# Patient Record
Sex: FEMALE | Race: WHITE | HISPANIC OR LATINO | Employment: FULL TIME | URBAN - METROPOLITAN AREA
[De-identification: names, ages, dates, MRNs, and addresses within clinical notes are randomized per-mention and may not be internally consistent; named-entity substitution may affect disease eponyms.]

---
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
## 2022-02-03 ENCOUNTER — OFFICE VISIT (OUTPATIENT)
Dept: NEUROLOGY | Facility: CLINIC | Age: 22
End: 2022-02-03
Payer: MEDICAID

## 2022-02-03 VITALS
BODY MASS INDEX: 24 KG/M2 | RESPIRATION RATE: 16 BRPM | SYSTOLIC BLOOD PRESSURE: 120 MMHG | HEART RATE: 94 BPM | DIASTOLIC BLOOD PRESSURE: 70 MMHG | WEIGHT: 121 LBS

## 2022-02-03 DIAGNOSIS — F41.9 ANXIETY: ICD-10-CM

## 2022-02-03 DIAGNOSIS — R25.1 EPISODE OF SHAKING: Primary | ICD-10-CM

## 2022-02-03 DIAGNOSIS — R55 SYNCOPE, UNSPECIFIED SYNCOPE TYPE: ICD-10-CM

## 2022-02-03 PROCEDURE — 99204 OFFICE O/P NEW MOD 45 MIN: CPT | Performed by: OTHER

## 2022-02-03 PROCEDURE — 3074F SYST BP LT 130 MM HG: CPT | Performed by: OTHER

## 2022-02-03 PROCEDURE — 3078F DIAST BP <80 MM HG: CPT | Performed by: OTHER

## 2022-02-03 RX ORDER — HYDROXYZINE HYDROCHLORIDE 25 MG/1
25 TABLET, FILM COATED ORAL EVERY 8 HOURS PRN
COMMUNITY
Start: 2022-01-18

## 2022-02-03 NOTE — PROGRESS NOTES
Patient here for evaluation of tremors in body for approx 1 year.  Had 2 episodes of loss of consciousness, 8/2021 & 12/2021

## 2022-02-11 ENCOUNTER — HOSPITAL ENCOUNTER (OUTPATIENT)
Dept: MRI IMAGING | Facility: HOSPITAL | Age: 22
Discharge: HOME | End: 2022-02-11
Attending: Other
Payer: MEDICAID

## 2022-02-11 DIAGNOSIS — R25.1 EPISODE OF SHAKING: ICD-10-CM

## 2022-02-11 PROCEDURE — 70551 MRI BRAIN STEM W/O DYE: CPT | Performed by: OTHER

## 2022-03-07 ENCOUNTER — NURSE ONLY (OUTPATIENT)
Dept: ELECTROPHYSIOLOGY | Facility: HOSPITAL | Age: 22
End: 2022-03-07
Attending: Other
Payer: MEDICAID

## 2022-03-07 DIAGNOSIS — R25.1 EPISODE OF SHAKING: ICD-10-CM

## 2022-03-07 PROCEDURE — 95816 EEG AWAKE AND DROWSY: CPT | Performed by: OTHER

## 2022-03-07 NOTE — PROCEDURES
Date of Procedure: 3/7/2022    Procedure: EEG (ELECTROENCEPHALOGRAM)     EPISODE OF SHAKING  HX:A 22YO FEMALE WHO PRESENTS FOR SHAKING/TEMOORS IN BODY FOR APPROX 1YR INTERMITTENTLY. PT HAD 2 EPISODES ASSOCIATED WITH LOC MOST OTHER OCCASIONS LIKELY DUE TO ANXIETY OR NERVIOUSNESS PER PT. DENIES HX OF SZ OR EPILESPY, NO FAMILY HX EITHER. ECG WAS REPORTED NORMAL BUT REPORTS HEART PALPITATIONS OFTEN. PT HAD ONE STARTLE LIKE JERKING MVMT WHILE SITTING IN [employer]ORS OFFICE PT WAS VERY NERVOUS ON 2/3/22. PT WAS GIVEN HYDROXYZINE FOR ANXIETY BUT PT ONLY TOOK FOR ONE WEEK AND THEN STOPPED REPORTED SHE DIDN'T NOTICE ANY DIFFERENCE. BACKGROUND ACTIVITY: Posterior rhythm was in the range of 7-9 Hz, reactive to eye opening; symmetrical and synchronous. Noted also are generalized intermittent slowing. Drowsiness is characterized by intermittent theta waves bitemporally. Occasional sharp transients noted in posterior region. EPILEPTIFORM DISCHARGES: There were intermittent runs of rhythmic slow sharp waves, occasional polyspikes in bilateral fronto-central regions with brief secondary generalization recorded. HYPERVENTILATION: Hyperventilation was performed with no change. PHOTIC STIMULATION: Photic stimulation was performed with no change. Stage II sleep was not reached. IMPRESSION: Abnormal EEG with the presence of sharp waves in bilateral fronto-central regions with brief secondary generalization suggestive of a low threshold for seizures. Clinical correlation is advised.     Caridad Lawson MD   Neurology  [facility]  3/7/2022, 12:50 PM  CC: None Pcp

## 2022-03-15 ENCOUNTER — OFFICE VISIT (OUTPATIENT)
Dept: NEUROLOGY | Facility: CLINIC | Age: 22
End: 2022-03-15
Payer: MEDICAID

## 2022-03-15 VITALS
SYSTOLIC BLOOD PRESSURE: 104 MMHG | HEIGHT: 60 IN | WEIGHT: 122 LBS | DIASTOLIC BLOOD PRESSURE: 68 MMHG | RESPIRATION RATE: 16 BRPM | HEART RATE: 92 BPM | BODY MASS INDEX: 23.95 KG/M2

## 2022-03-15 DIAGNOSIS — G40.909 SEIZURE DISORDER (HCC): Primary | ICD-10-CM

## 2022-03-15 PROCEDURE — 99215 OFFICE O/P EST HI 40 MIN: CPT | Performed by: OTHER

## 2022-03-15 PROCEDURE — 3008F BODY MASS INDEX DOCD: CPT | Performed by: OTHER

## 2022-03-15 PROCEDURE — 3074F SYST BP LT 130 MM HG: CPT | Performed by: OTHER

## 2022-03-15 PROCEDURE — 3078F DIAST BP <80 MM HG: CPT | Performed by: OTHER

## 2022-03-15 RX ORDER — TOPIRAMATE 25 MG/1
TABLET ORAL
Qty: 120 TABLET | Refills: 2 | Status: SHIPPED | OUTPATIENT
Start: 2022-03-15

## 2022-03-15 NOTE — PROGRESS NOTES
LOV 2/3/22 Episode of Shaking/Syncope/Anxiety f/u- Patient states she is still having shaking episodes. Patient denies any syncope episodes. Patient never went to see Cardiologist. Patient had EEG done 3/7/22. Patient had MRI BRAIN done 2/11/22.

## 2022-06-07 NOTE — ED INITIAL ASSESSMENT (HPI)
PT BIT BY DOG ON LEFT HAND BY DOG ON 11/30, C/O NUMBNESS AND COLD TO TOUCH   PT WAS SEEN IN URGENT CARE AND GIVEN ABX.    PT WAKES UP FROM SLEEP WITH STINGING AND ITCHINESS TO SITE, STERI STRIPS IN PLACE, PT UNABLE TO OPEN HAND COMPLETELY PAST MEDICAL HISTORY:  Marysol-Danlos disease     Endometriosis     Fibromyalgia     GERD (gastroesophageal reflux disease)

## 2022-11-07 RX ORDER — TOPIRAMATE 25 MG/1
TABLET ORAL
Qty: 120 TABLET | Refills: 5 | OUTPATIENT
Start: 2022-11-07

## 2022-11-08 RX ORDER — TOPIRAMATE 25 MG/1
50 TABLET ORAL 2 TIMES DAILY
Qty: 120 TABLET | Refills: 5 | Status: SHIPPED | OUTPATIENT
Start: 2022-11-08

## 2022-11-15 ENCOUNTER — OFFICE VISIT (OUTPATIENT)
Dept: NEUROLOGY | Facility: CLINIC | Age: 22
End: 2022-11-15
Payer: MEDICAID

## 2022-11-15 VITALS
RESPIRATION RATE: 16 BRPM | WEIGHT: 101 LBS | HEART RATE: 70 BPM | DIASTOLIC BLOOD PRESSURE: 70 MMHG | SYSTOLIC BLOOD PRESSURE: 122 MMHG | BODY MASS INDEX: 20 KG/M2

## 2022-11-15 DIAGNOSIS — G40.909 SEIZURE DISORDER (HCC): Primary | ICD-10-CM

## 2022-11-15 DIAGNOSIS — F41.9 ANXIETY: ICD-10-CM

## 2022-11-15 PROCEDURE — 3074F SYST BP LT 130 MM HG: CPT | Performed by: OTHER

## 2022-11-15 PROCEDURE — 3078F DIAST BP <80 MM HG: CPT | Performed by: OTHER

## 2022-11-15 PROCEDURE — 99214 OFFICE O/P EST MOD 30 MIN: CPT | Performed by: OTHER

## 2022-11-15 RX ORDER — LEVETIRACETAM 500 MG/1
500 TABLET ORAL 2 TIMES DAILY
Qty: 60 TABLET | Refills: 5 | Status: SHIPPED | OUTPATIENT
Start: 2022-11-15

## 2022-11-15 NOTE — PROGRESS NOTES
Patient here to follow up regarding seizures. No seizures since last visit.  Has not yet seen psychiatrist.

## 2023-04-20 DIAGNOSIS — G40.909 SEIZURE DISORDER (HCC): Primary | ICD-10-CM

## 2023-04-21 RX ORDER — LEVETIRACETAM 500 MG/1
500 TABLET ORAL 2 TIMES DAILY
Qty: 60 TABLET | Refills: 2 | Status: SHIPPED | OUTPATIENT
Start: 2023-04-21

## 2023-07-18 DIAGNOSIS — G40.909 SEIZURE DISORDER (HCC): ICD-10-CM

## 2023-07-18 RX ORDER — LEVETIRACETAM 500 MG/1
500 TABLET ORAL 2 TIMES DAILY
Qty: 60 TABLET | Refills: 2 | Status: SHIPPED | OUTPATIENT
Start: 2023-07-18

## 2023-07-18 NOTE — TELEPHONE ENCOUNTER
Medication: LEVETIRACETAM 500 MG Oral Tab     Date of last refill: 04/21/23 (60/2)  [place] 30/98/95    Last office visit: 11/15/22  Due back to clinic per last office note:  6 months  Date next office visit scheduled:  No future appointments. Last OV note recommendation:        Plan:  Instructed pt to wean off topamax due to weight loss: take 1 tab bid po x 1 week, then stop completely  Start keppra 500 mg bid in the meantime, some drowsiness and fatigue are common side effects when starting this medication, please monitor and keep us informed with any other possible side effects  Advised pt to see PCP (re; weight loss) and Psychiatry (re; anxiety) when able  D/w pt re; assessment , care plan  See orders and medications filed with this encounter. The patient indicates understanding of these issues and agrees with the plan.   Patient should not drive, operate heavy machine unless seizure free for 6 months  RTC 6 months, other AED options; lamictal, zonegran

## 2023-07-21 DIAGNOSIS — G40.909 SEIZURE DISORDER (HCC): ICD-10-CM

## 2023-07-21 NOTE — TELEPHONE ENCOUNTER
Duplicated medication refill request 07/18/23    Medication: levETIRAcetam 500 MG Oral Tab     Date of last refill: 07/18/23 (60/2)  [place] 32[place]38[place]02

## 2023-07-24 RX ORDER — LEVETIRACETAM 500 MG/1
500 TABLET ORAL 2 TIMES DAILY
Qty: 60 TABLET | Refills: 2 | OUTPATIENT
Start: 2023-07-24

## 2023-09-28 ENCOUNTER — LAB ENCOUNTER (OUTPATIENT)
Dept: LAB | Age: 23
End: 2023-09-28
Attending: OBSTETRICS & GYNECOLOGY
Payer: MEDICAID

## 2023-09-28 ENCOUNTER — OFFICE VISIT (OUTPATIENT)
Dept: OBGYN CLINIC | Facility: CLINIC | Age: 23
End: 2023-09-28

## 2023-09-28 VITALS
WEIGHT: 123.63 LBS | HEIGHT: 60 IN | SYSTOLIC BLOOD PRESSURE: 112 MMHG | DIASTOLIC BLOOD PRESSURE: 72 MMHG | BODY MASS INDEX: 24.27 KG/M2

## 2023-09-28 DIAGNOSIS — Z13.220 SCREENING, LIPID: ICD-10-CM

## 2023-09-28 DIAGNOSIS — Z13.29 THYROID DISORDER SCREENING: ICD-10-CM

## 2023-09-28 DIAGNOSIS — Z01.419 ENCOUNTER FOR WELL WOMAN EXAM WITH ROUTINE GYNECOLOGICAL EXAM: Primary | ICD-10-CM

## 2023-09-28 DIAGNOSIS — Z13.1 SCREENING FOR DIABETES MELLITUS (DM): ICD-10-CM

## 2023-09-28 DIAGNOSIS — Z11.3 SCREENING FOR STD (SEXUALLY TRANSMITTED DISEASE): ICD-10-CM

## 2023-09-28 DIAGNOSIS — Z12.4 ENCOUNTER FOR PAPANICOLAOU SMEAR FOR CERVICAL CANCER SCREENING: ICD-10-CM

## 2023-09-28 LAB
ALBUMIN SERPL-MCNC: 4 G/DL (ref 3.4–5)
ALBUMIN/GLOB SERPL: 1 {RATIO} (ref 1–2)
ALP LIVER SERPL-CCNC: 76 U/L
ALT SERPL-CCNC: 43 U/L
ANION GAP SERPL CALC-SCNC: 7 MMOL/L (ref 0–18)
AST SERPL-CCNC: 23 U/L (ref 15–37)
BILIRUB SERPL-MCNC: 0.4 MG/DL (ref 0.1–2)
BUN BLD-MCNC: 9 MG/DL (ref 7–18)
CALCIUM BLD-MCNC: 8.8 MG/DL (ref 8.5–10.1)
CHLORIDE SERPL-SCNC: 107 MMOL/L (ref 98–112)
CHOLEST SERPL-MCNC: 152 MG/DL (ref ?–200)
CO2 SERPL-SCNC: 24 MMOL/L (ref 21–32)
CREAT BLD-MCNC: 0.7 MG/DL
EGFRCR SERPLBLD CKD-EPI 2021: 126 ML/MIN/1.73M2 (ref 60–?)
FASTING PATIENT LIPID ANSWER: NO
FASTING STATUS PATIENT QL REPORTED: NO
GLOBULIN PLAS-MCNC: 3.9 G/DL (ref 2.8–4.4)
GLUCOSE BLD-MCNC: 92 MG/DL (ref 70–99)
HDLC SERPL-MCNC: 71 MG/DL (ref 40–59)
LDLC SERPL CALC-MCNC: 69 MG/DL (ref ?–100)
NONHDLC SERPL-MCNC: 81 MG/DL (ref ?–130)
OSMOLALITY SERPL CALC.SUM OF ELEC: 284 MOSM/KG (ref 275–295)
POTASSIUM SERPL-SCNC: 4.1 MMOL/L (ref 3.5–5.1)
PROT SERPL-MCNC: 7.9 G/DL (ref 6.4–8.2)
SODIUM SERPL-SCNC: 138 MMOL/L (ref 136–145)
TRIGL SERPL-MCNC: 58 MG/DL (ref 30–149)
TSI SER-ACNC: 0.85 MIU/ML (ref 0.36–3.74)
VLDLC SERPL CALC-MCNC: 9 MG/DL (ref 0–30)

## 2023-09-28 PROCEDURE — 80053 COMPREHEN METABOLIC PANEL: CPT

## 2023-09-28 PROCEDURE — 3078F DIAST BP <80 MM HG: CPT | Performed by: OBSTETRICS & GYNECOLOGY

## 2023-09-28 PROCEDURE — 3008F BODY MASS INDEX DOCD: CPT | Performed by: OBSTETRICS & GYNECOLOGY

## 2023-09-28 PROCEDURE — 99385 PREV VISIT NEW AGE 18-39: CPT | Performed by: OBSTETRICS & GYNECOLOGY

## 2023-09-28 PROCEDURE — 84443 ASSAY THYROID STIM HORMONE: CPT

## 2023-09-28 PROCEDURE — 80061 LIPID PANEL: CPT

## 2023-09-28 PROCEDURE — 3074F SYST BP LT 130 MM HG: CPT | Performed by: OBSTETRICS & GYNECOLOGY

## 2023-09-29 LAB
C TRACH DNA SPEC QL NAA+PROBE: NEGATIVE
N GONORRHOEA DNA SPEC QL NAA+PROBE: NEGATIVE

## 2023-10-03 LAB — HPV I/H RISK 1 DNA SPEC QL NAA+PROBE: NEGATIVE

## 2023-10-28 DIAGNOSIS — G40.909 SEIZURE DISORDER (HCC): ICD-10-CM

## 2023-10-30 RX ORDER — LEVETIRACETAM 500 MG/1
500 TABLET ORAL 2 TIMES DAILY
Qty: 60 TABLET | Refills: 0 | Status: SHIPPED | OUTPATIENT
Start: 2023-10-30

## 2023-10-30 NOTE — TELEPHONE ENCOUNTER
Medication:  LEVETIRACETAM 500 MG Oral Tab     Date of last refill: 07/18/23 (60/2)  [place] 04/62/92    Last office visit: 11/15/22  Due back to clinic per last office note:  6 months  Date next office visit scheduled:    Future Appointments   Date Time Provider Minnie Sesay   10/3/2024 10:00 AM Tita Dangelo MD [facility]        Last OV note recommendation:       Plan:  Instructed pt to wean off topamax due to weight loss: take 1 tab bid po x 1 week, then stop completely  Start keppra 500 mg bid in the meantime, some drowsiness and fatigue are common side effects when starting this medication, please monitor and keep us informed with any other possible side effects  Advised pt to see PCP (re; weight loss) and Psychiatry (re; anxiety) when able  D/w pt re; assessment , care plan  See orders and medications filed with this encounter. The patient indicates understanding of these issues and agrees with the plan.   Patient should not drive, operate heavy machine unless seizure free for 6 months  RTC 6 months, other AED options; lamictal, zonegran     Orders Placed This Encounter      Psychiatry Referral - In Network given again today     Pt should go ER for any new or worsening symptoms and contact office

## 2023-11-21 DIAGNOSIS — G40.909 SEIZURE DISORDER (HCC): ICD-10-CM

## 2023-11-21 RX ORDER — LEVETIRACETAM 500 MG/1
500 TABLET ORAL 2 TIMES DAILY
Qty: 60 TABLET | Refills: 0 | Status: SHIPPED | OUTPATIENT
Start: 2023-11-21

## 2023-11-21 NOTE — TELEPHONE ENCOUNTER
Sent the patient a [employer] message to make an appt for further medication refills. Medication:  levETIRAcetam 500 MG Oral Tab      Date of last refill: 10/30/2023 (#60/0)  [place]     Last office visit: 11/15/2022  Due back to clinic per last office note:  6 months  Date next office visit scheduled:    Future Appointments   Date Time Provider Minnie Sesay   10/3/2024 10:00 AM MD BERE OrourkeWDSHAUN ADAMSON           Last OV note recommendation:    Plan:  Instructed pt to wean off topamax due to weight loss: take 1 tab bid po x 1 week, then stop completely  Start keppra 500 mg bid in the meantime, some drowsiness and fatigue are common side effects when starting this medication, please monitor and keep us informed with any other possible side effects  Advised pt to see PCP (re; weight loss) and Psychiatry (re; anxiety) when able  D/w pt re; assessment , care plan  See orders and medications filed with this encounter. The patient indicates understanding of these issues and agrees with the plan.   Patient should not drive, operate heavy machine unless seizure free for 6 months  RTC 6 months, other AED options; lamictal, zonegran     Orders Placed This Encounter      Psychiatry Referral - In Network given again today     Pt should go ER for any new or worsening symptoms and contact office

## 2024-01-29 DIAGNOSIS — G40.909 SEIZURE DISORDER (HCC): ICD-10-CM

## 2024-01-29 NOTE — TELEPHONE ENCOUNTER
Pt has appt scheduled for tomorrow, 1/30/24. Per last refill, has been out for some time. Can be filled at OV tomorrow. Will hold Rx     Medication: Keppra    Date of last refill: 11/21/23 for #60/0 additional refill  [place]    Last office visit: 11/15/22  Due back to clinic per last office note:  6 months  Date next office visit scheduled:  1/30/24    Last OV note recommendation: per Dr. Patel:    Plan:  Instructed pt to wean off topamax due to weight loss: take 1 tab bid po x 1 week, then stop completely  Start keppra 500 mg bid in the meantime, some drowsiness and fatigue are common side effects when starting this medication, please monitor and keep us informed with any other possible side effects  Advised pt to see PCP (re; weight loss) and Psychiatry (re; anxiety) when able  D/w pt re; assessment , care plan  See orders and medications filed with this encounter. The patient indicates understanding of these issues and agrees with the plan.  Patient should not drive, operate heavy machine unless seizure free for 6 months  RTC 6 months, other AED options; lamictal, zonegran     Orders Placed This Encounter      Psychiatry Referral - In Network given again today     Pt should go ER for any new or worsening symptoms and contact office

## 2024-01-30 ENCOUNTER — OFFICE VISIT (OUTPATIENT)
Dept: NEUROLOGY | Facility: CLINIC | Age: 24
End: 2024-01-30

## 2024-01-30 VITALS — HEART RATE: 78 BPM | DIASTOLIC BLOOD PRESSURE: 64 MMHG | RESPIRATION RATE: 16 BRPM | SYSTOLIC BLOOD PRESSURE: 122 MMHG

## 2024-01-30 DIAGNOSIS — G43.009 MIGRAINE WITHOUT AURA AND WITHOUT STATUS MIGRAINOSUS, NOT INTRACTABLE: ICD-10-CM

## 2024-01-30 DIAGNOSIS — G40.909 SEIZURE DISORDER (HCC): Primary | ICD-10-CM

## 2024-01-30 PROCEDURE — 99215 OFFICE O/P EST HI 40 MIN: CPT | Performed by: OTHER

## 2024-01-30 RX ORDER — LEVETIRACETAM 500 MG/1
500 TABLET ORAL 2 TIMES DAILY
Qty: 180 TABLET | Refills: 3 | Status: SHIPPED | OUTPATIENT
Start: 2024-01-30

## 2024-01-30 RX ORDER — RIMEGEPANT SULFATE 75 MG/75MG
75 TABLET, ORALLY DISINTEGRATING ORAL AS NEEDED
Qty: 4 TABLET | Refills: 0 | COMMUNITY
Start: 2024-01-30 | End: 2025-01-29

## 2024-01-30 NOTE — PATIENT INSTRUCTIONS
Refill policies:    Allow 2-3 business days for refills; controlled substances may take longer.  Contact your pharmacy at least 5 days prior to running out of medication and have them send an electronic request or submit request through the “request refill” option in your [employer] account.  Refills are not addressed on weekends; covering physicians do not authorize routine medications on weekends.  No narcotics or controlled substances are refilled after noon on Fridays or by on call physicians.  By law, narcotics must be electronically prescribed.  A 30 day supply with no refills is the maximum allowed.  If your prescription is due for a refill, you may be due for a follow up appointment.  To best provide you care, patients receiving routine medications need to be seen at least once a year.  Patients receiving narcotic/controlled substance medications need to be seen at least once every 3 months.  In the event that your preferred pharmacy does not have the requested medication in stock (e.g. Backordered), it is your responsibility to find another pharmacy that has the requested medication available.  We will gladly send a new prescription to that pharmacy at your request.    Scheduling Tests:    If your physician has ordered radiology tests such as MRI or CT scans, please contact Central Scheduling at [phone] right away to schedule the test.  Once scheduled, the [facility] hospital Centralized Referral Team will work with your insurance carrier to obtain pre-certification or prior authorization.  Depending on your insurance carrier, approval may take 3-10 days.  It is highly recommended patients assure they have received an authorization before having a test performed.  If test is done without insurance authorization, patient may be responsible for the entire amount billed.      Precertification and Prior Authorizations:  If your physician has recommended that you have a procedure or additional testing performed the [facility] hospital  Centralized Referral Team will contact your insurance carrier to obtain pre-certification or prior authorization.    You are strongly encouraged to contact your insurance carrier to verify that your procedure/test has been approved and is a COVERED benefit.  Although the [facility] Centralized Referral Team does its due diligence, the insurance carrier gives the disclaimer that \"Although the procedure is authorized, this does not guarantee payment.\"    Ultimately the patient is responsible for payment.   Thank you for your understanding in this matter.  Paperwork Completion:  If you require FMLA or disability paperwork for your recovery, please make sure to either drop it off or have it faxed to our office at [phone]. Be sure the form has your name and date of birth on it.  The form will be faxed to our Forms Department and they will complete it for you.  There is a 25$ fee for all forms that need to be filled out.  Please be aware there is a 10-14 day turnaround time.  You will need to sign a release of information (TRISTON) form if your paperwork does not come with one.  You may call the Forms Department with any questions at [phone].  Their fax number is [phone].

## 2024-01-30 NOTE — PROGRESS NOTES
Patient here to follow up regarding seizures. Did have seizure in December 2023. Has been taking Keppra daily   Other Specify

## 2024-01-30 NOTE — PROGRESS NOTES
[facility] Neurology Outpatient Progress Note  Date of service: 1/30/2024    Assessment:     ICD-10-CM    1. Seizure disorder (HCC) : worse G40.909       Headache; migraine vs ictal headache    Plan:  Seizure precaution reviewed  EEG  Check keppra level  Compliance education given  Keppra 500 mg bid  Nurtec prn trial for headache , sample given  See orders and medications filed with this encounter. The patient indicates understanding of these issues and agrees with the plan.  Discussed with patient/family regarding assessment, work up, care plan   patient should not drive, operate heavy machine, climb ladder, swim, or tub bath for seizure precaution unless patient has been seizure free for > 6 months.  RTC 6 months  Pt should go ER for any new or worsening symptoms and contact office for above tests' results, any possible side effects from medication or other concerns.    Subjective:   History:  Patient here for a follow-up visit for seizure. Since last visit she had breakthrough seizure in December. States she has headache often.  She is only taking keppra once a day, self reduced keppa.  Last visit was 11/15/2022. Has not seen me since.  Pt had seizures at work and did not go to er .   Had a seizure when at work on December 29th. Hit head hard on floor, at [employer]. It was unwitnessed. Last seizure prior to recent one was December of 2022.     History/Other:   REVIEW OF SYSTEMS:  A 10-point system was reviewed. Pertinent positives and negatives are noted as above       Current Outpatient Medications:     levETIRAcetam 500 MG Oral Tab, Take 1 tablet (500 mg total) by mouth 2 (two) times daily. (Patient taking differently: Take 1 tablet (500 mg total) by mouth daily.), Disp: 60 tablet, Rfl: 0  Allergies:  Allergies   Allergen Reactions    Seasonal Runny nose     Past Medical History:   Diagnosis Date    Anxiety     Laceration of hand 05/16/2022    cut hand, stitches    Seizures (HCC)      No past surgical history on  file.  Social History:  Social History     Tobacco Use    Smoking status: Never    Smokeless tobacco: Never   Substance Use Topics    Alcohol use: Never     Family History   Problem Relation Age of Onset    No Known Problems Father     No Known Problems Mother       Objective:   Neurological Examination:  /64   Pulse 78   Resp 16   LMP 09/09/2023   Mental status: A & O X 3  Language: no aphasia  Speech: no dysarthria  CN II-XII: intact   Motor strength: 5/5 all extremities  Tone: normal  DTRs: 2+ symmetric  Coordination: normal  Sensory: symmetric  Gait: normal    Test reviewed on 1/30/2024      Bryce Patel MD (Michael)  Neurology  [facility]  1/30/2024, 11:33 AM  CC: Nancy Perry

## 2024-02-01 RX ORDER — LEVETIRACETAM 500 MG/1
500 TABLET ORAL 2 TIMES DAILY
Qty: 180 TABLET | Refills: 0 | OUTPATIENT
Start: 2024-02-01

## 2024-05-21 DIAGNOSIS — G40.909 SEIZURE DISORDER (HCC): ICD-10-CM

## 2024-05-22 RX ORDER — LEVETIRACETAM 500 MG/1
500 TABLET ORAL 2 TIMES DAILY
Qty: 180 TABLET | Refills: 3 | OUTPATIENT
Start: 2024-05-22

## 2024-05-22 NOTE — TELEPHONE ENCOUNTER
Spoke with [facility] pharmacist ( Taya) who states patient has refills and will get it ready for her.

## 2024-07-30 ENCOUNTER — TELEPHONE (OUTPATIENT)
Dept: NEUROLOGY | Facility: CLINIC | Age: 24
End: 2024-07-30

## 2024-07-30 ENCOUNTER — OFFICE VISIT (OUTPATIENT)
Dept: NEUROLOGY | Facility: CLINIC | Age: 24
End: 2024-07-30

## 2024-07-30 VITALS
SYSTOLIC BLOOD PRESSURE: 112 MMHG | WEIGHT: 130 LBS | RESPIRATION RATE: 16 BRPM | DIASTOLIC BLOOD PRESSURE: 76 MMHG | HEART RATE: 97 BPM | BODY MASS INDEX: 25 KG/M2

## 2024-07-30 DIAGNOSIS — G40.909 SEIZURE DISORDER (HCC): Primary | ICD-10-CM

## 2024-07-30 DIAGNOSIS — G43.009 MIGRAINE WITHOUT AURA AND WITHOUT STATUS MIGRAINOSUS, NOT INTRACTABLE: ICD-10-CM

## 2024-07-30 PROCEDURE — 99214 OFFICE O/P EST MOD 30 MIN: CPT | Performed by: OTHER

## 2024-07-30 RX ORDER — LEVETIRACETAM 500 MG/1
500 TABLET ORAL 2 TIMES DAILY
Qty: 180 TABLET | Refills: 3 | Status: SHIPPED | OUTPATIENT
Start: 2024-07-30

## 2024-07-30 NOTE — PROGRESS NOTES
[facility] Neurology Outpatient Progress Note  Date of service: 7/30/2024    Assessment:     ICD-10-CM    1. Seizure disorder (HCC)  G40.909 Levetiracetam, S     EEG     levETIRAcetam 500 MG Oral Tab      2. Migraine without aura and without status migrainosus, not intractable  G43.009         No seizure since last visit  Migraine is better    Plan:      Procedures    Levetiracetam, S   EEG ordered again  Check keppra level  Keppra 500 mg bid. Most likely she will need to stay on ASM.  No Nurtec   May consider fioricet or imitrex for migraine  Seizure precaution, education given  See orders and medications filed with this encounter. The patient indicates understanding of these issues and agrees with the plan.  Discussed with patient/family regarding assessment, care plan   RTC 6 months, offered pt to see Dr Ramsay , the epileptologist, if interested  Pt should go ER for any new or worsening symptoms and contact office .    Subjective:   History:  Patient here for a follow-up visit for seizure. Since last visit no seizure reported, she had breakthrough seizure in December 2023; States headache is better, did not tolerate nurtec well. States she does not have much headache recently.  Pt had seizures at work on December 29th and did not go to ER.   Hit head hard on floor, at [facility]. It was unwitnessed. Last seizure prior to recent one was December of 2022.    History/Other:   REVIEW OF SYSTEMS:  A 10-point system was reviewed. Pertinent positives and negatives are noted as above       Current Outpatient Medications:     levETIRAcetam 500 MG Oral Tab, Take 1 tablet (500 mg total) by mouth 2 (two) times daily., Disp: 180 tablet, Rfl: 3  Allergies:  Allergies   Allergen Reactions    Seasonal Runny nose     Past Medical History:    Anxiety    Laceration of hand    cut hand, stitches    Seizures (HCC)     History reviewed. No pertinent surgical history.  Social History:  Social History     Tobacco Use    Smoking status: Never     Smokeless tobacco: Never   Substance Use Topics    Alcohol use: Never     Family History   Problem Relation Age of Onset    No Known Problems Father     No Known Problems Mother       Objective:   Neurological Examination:  /76   Pulse 97   Resp 16   Wt 130 lb (59 kg)   LMP 09/09/2023   BMI 25.39 kg/m²   Mental status: A & O X 3  Language: no aphasia  Speech: no dysarthria  CN II-XII: intact   Motor strength: 5/5 all extremities  Tone: normal  DTRs: 2+ symmetric  Coordination: normal  Sensory: symmetric  Gait: normal    Test reviewed on 7/30/2024      Bryce \"Cuate\"MD Jorge  Neurology  [facility]  7/30/2024, 10:20 AM  CC: Nancy Perry

## 2024-07-30 NOTE — PATIENT INSTRUCTIONS
Refill policies:    Allow 2-3 business days for refills; controlled substances may take longer.  Contact your pharmacy at least 5 days prior to running out of medication and have them send an electronic request or submit request through the “request refill” option in your [employer] account.  Refills are not addressed on weekends; covering physicians do not authorize routine medications on weekends.  No narcotics or controlled substances are refilled after noon on Fridays or by on call physicians.  By law, narcotics must be electronically prescribed.  A 30 day supply with no refills is the maximum allowed.  If your prescription is due for a refill, you may be due for a follow up appointment.  To best provide you care, patients receiving routine medications need to be seen at least once a year.  Patients receiving narcotic/controlled substance medications need to be seen at least once every 3 months.  In the event that your preferred pharmacy does not have the requested medication in stock (e.g. Backordered), it is your responsibility to find another pharmacy that has the requested medication available.  We will gladly send a new prescription to that pharmacy at your request.    Scheduling Tests:    If your physician has ordered radiology tests such as MRI or CT scans, please contact Central Scheduling at [phone] right away to schedule the test.  Once scheduled, the [facility] hospital Centralized Referral Team will work with your insurance carrier to obtain pre-certification or prior authorization.  Depending on your insurance carrier, approval may take 3-10 days.  It is highly recommended patients assure they have received an authorization before having a test performed.  If test is done without insurance authorization, patient may be responsible for the entire amount billed.      Precertification and Prior Authorizations:  If your physician has recommended that you have a procedure or additional testing performed the [facility] hospital  Centralized Referral Team will contact your insurance carrier to obtain pre-certification or prior authorization.    You are strongly encouraged to contact your insurance carrier to verify that your procedure/test has been approved and is a COVERED benefit.  Although the [facility] Centralized Referral Team does its due diligence, the insurance carrier gives the disclaimer that \"Although the procedure is authorized, this does not guarantee payment.\"    Ultimately the patient is responsible for payment.   Thank you for your understanding in this matter.  Paperwork Completion:  If you require FMLA or disability paperwork for your recovery, please make sure to either drop it off or have it faxed to our office at [phone]. Be sure the form has your name and date of birth on it.  The form will be faxed to our Forms Department and they will complete it for you.  There is a 25$ fee for all forms that need to be filled out.  Please be aware there is a 10-14 day turnaround time.  You will need to sign a release of information (TRISTON) form if your paperwork does not come with one.  You may call the Forms Department with any questions at [phone].  Their fax number is [phone].

## 2024-10-03 ENCOUNTER — LAB ENCOUNTER (OUTPATIENT)
Dept: LAB | Age: 24
End: 2024-10-03
Attending: OBSTETRICS & GYNECOLOGY

## 2024-10-03 ENCOUNTER — OFFICE VISIT (OUTPATIENT)
Dept: OBGYN CLINIC | Facility: CLINIC | Age: 24
End: 2024-10-03

## 2024-10-03 VITALS
WEIGHT: 137.88 LBS | BODY MASS INDEX: 27.07 KG/M2 | HEIGHT: 60 IN | SYSTOLIC BLOOD PRESSURE: 119 MMHG | DIASTOLIC BLOOD PRESSURE: 78 MMHG | HEART RATE: 85 BPM

## 2024-10-03 DIAGNOSIS — Z11.3 SCREEN FOR STD (SEXUALLY TRANSMITTED DISEASE): ICD-10-CM

## 2024-10-03 DIAGNOSIS — Z01.419 ENCOUNTER FOR WELL WOMAN EXAM WITH ROUTINE GYNECOLOGICAL EXAM: Primary | ICD-10-CM

## 2024-10-03 DIAGNOSIS — Z12.4 ENCOUNTER FOR PAPANICOLAOU SMEAR FOR CERVICAL CANCER SCREENING: ICD-10-CM

## 2024-10-03 LAB
HBV SURFACE AG SER-ACNC: <0.1 [IU]/L
HBV SURFACE AG SERPL QL IA: NONREACTIVE
HCV AB SERPL QL IA: NONREACTIVE
T PALLIDUM AB SER QL IA: NONREACTIVE

## 2024-10-03 PROCEDURE — 86780 TREPONEMA PALLIDUM: CPT

## 2024-10-03 PROCEDURE — 87389 HIV-1 AG W/HIV-1&-2 AB AG IA: CPT

## 2024-10-03 PROCEDURE — 36415 COLL VENOUS BLD VENIPUNCTURE: CPT

## 2024-10-03 PROCEDURE — 86803 HEPATITIS C AB TEST: CPT

## 2024-10-03 PROCEDURE — 87340 HEPATITIS B SURFACE AG IA: CPT

## 2024-10-03 PROCEDURE — 99395 PREV VISIT EST AGE 18-39: CPT | Performed by: OBSTETRICS & GYNECOLOGY

## 2024-10-03 NOTE — PROGRESS NOTES
[facility]  Obstetrics and Gynecology  Gynecology Visit    Chief Complaint   Patient presents with    Annual           Verónica Monsivais is a 24 year old female who presents for annual .    LMP: 24.    Menses regular: normal .    Menstrual flow normal: 28 days .    Birth control or HRT:  none .   Refill none   Last Pap Smear: 23  .  Any history of abnormal paps: none    Last MMG:  under age   Any Medication Refills needed today?: none   Sleep: 6-7 hours .    Diet:  normal .    Exercise:  2 times a week .   Screening labs/Blood work today: std blood work .     Colonoscopy (if over 44 y/o): n/a .   Gardasil:(age 9-44 y/o) n/a .   Genetic Cancer screen (if indicated):  n/a .   Flu (Aug-April):  n/a .TDAP (every 10 years)  up to date .      Additional Problems/concerns: no concerns .      Next Appt: 10/02/25     Immunization History   Administered Date(s) Administered    TDAP 2021         Current Outpatient Medications:     levETIRAcetam 500 MG Oral Tab, Take 1 tablet (500 mg total) by mouth 2 (two) times daily., Disp: 180 tablet, Rfl: 3    Allergies   Allergen Reactions    Seasonal Runny nose       OB History    Para Term  AB Living   0 0 0 0 0 0   SAB IAB Ectopic Multiple Live Births   0 0 0 0 0       Past Medical History:    Anxiety    Laceration of hand    cut hand, stitches    Seizures (HCC)       No past surgical history on file.    Family History   Problem Relation Age of Onset    No Known Problems Father     No Known Problems Mother                Social History     Socioeconomic History    Marital status: Single     Spouse name: Not on file    Number of children: Not on file    Years of education: Not on file    Highest education level: Not on file   Occupational History    Not on file   Tobacco Use    Smoking status: Never    Smokeless tobacco: Never   Vaping Use    Vaping status: Never Used   Substance and Sexual Activity    Alcohol use: Never    Drug use: Never    Sexual  activity: Not on file   Other Topics Concern     Service Not Asked    Blood Transfusions Not Asked    Caffeine Concern No    Occupational Exposure Not Asked    Hobby Hazards Not Asked    Sleep Concern Not Asked    Stress Concern Not Asked    Weight Concern Not Asked    Special Diet Not Asked    Back Care Not Asked    Exercise Yes     Comment: every other day    Bike Helmet Not Asked    Seat Belt Not Asked    Self-Exams Not Asked   Social History Narrative    Not on file     Social Determinants of Health     Financial Resource Strain: Not on File (10/6/2022)    Received from [employer]    Financial Resource Strain     Financial Resource Strain: 0   Food Insecurity: Not on File (9/26/2024)    Received from [employer]    Food Insecurity     Food: 0   Transportation Needs: Not on File (10/6/2022)    Received from [employer]    Transportation Needs     Transportation: 0   Physical Activity: Not on File (10/6/2022)    Received from [employer]    Physical Activity     Physical Activity: 0   Stress: Not on File (10/6/2022)    Received from [facility]    Stress     Stress: 0   Social Connections: Not on File (9/13/2024)    Received from [employer]    Social Connections     Connectedness: 0   Housing Stability: At Risk (8/18/2023)    Received from [employer]    [facility]     Living Situation: Not on file     Housing Problems: Not on file     /78 (BP Location: Right arm, Patient Position: Sitting, Cuff Size: adult)   Pulse 85   Ht 5' (1.524 m)   Wt 137 lb 14.4 oz (62.5 kg)   LMP 09/12/2024   BMI 26.93 kg/m²     Wt Readings from Last 3 Encounters:   10/03/24 137 lb 14.4 oz (62.5 kg)   07/30/24 130 lb (59 kg)   09/28/23 123 lb 9.6 oz (56.1 kg)         Health Maintenance   Topic Date Due    Influenza Vaccine (1) 08/01/2021    Screen for Cervical Cancer 11/05/2021    DTaP,Tdap and Td Vaccines (3 - Td or Tdap) 03/18/2025    Hepatitis C Screening Completed    HIV Screening Completed    COVID-19  Vaccine Completed     Review of Systems   General: Present- Feeling well. Not Present- Chills, Fever, Weight Gain and Weight Loss.  HEENT: Not Present- Headache and Sore Throat.  Respiratory: Not Present- Cough, Difficulty Breathing, Hemoptysis and Sputum Production.  Cardiovascular: Not Present- Chest Pain, Elevated Blood Pressure, Fainting / Blacking Out and Shortness of Breath.  Gastrointestinal: Not Present- Constipation, Diarrhea, Nausea and Vomiting.  Female Genitourinary: Not Present- Discharge, Dysuria and Frequency.  Musculoskeletal: Not Present- Leg Cramps and Swelling of Extremities.  Neurological: Not Present- Dizziness and Headaches.  Psychiatric: Not Present- Anxiety and Depression.  Endocrine: Not Present- Appetite Changes, Hair Changes and Thyroid Problems.  Hematology: Not Present- Easy Bruising and Excessive bleeding.  All other systems negative     Physical Exam The physical exam findings are as follows:     General   Mental Status - Alert. General Appearance - Cooperative. Orientation - Oriented X4. Build & Nutrition - Well nourished.    Head and Neck  Thyroid   Gland Characteristics - normal size and consistency.    Chest and Lung Exam   Inspection:   Chest Wall: - Normal.  Percussion:   Quality and Intensity: - Percussion normal.  Palpation: - Palpation normal.  Auscultation:   Breath sounds: - Normal.  Adventitious sounds: - No Adventitious sounds.    Breast   Nipples: Characteristics - Bilateral - Normal. Discharge - Bilateral - None.  Breast - Bilateral - Symmetric.    Cardiovascular   Auscultation: Rhythm - Regular. Heart Sounds - Normal heart sounds.  Murmurs & Other Heart Sounds: Auscultation of the heart reveals - No Murmurs.    Abdomen   Inspection: Inspection of the abdomen reveals - No Hernias. Incisional scars - no incisional scars.  Palpation/Percussion: Palpation and Percussion of the abdomen reveal - Non Tender and No Palpable abdominal masses.  Liver: - Normal.  Auscultation:  Auscultation of the abdomen reveals - Bowel sounds normal.    Female Genitourinary     External Genitalia   Perineum - Normal. Bartholin's Gland - Bilateral - Normal. Clitoris - Normal.  Introitus: Characteristics - No Cystocele, Enterocele or Rectocele. Discharge - None.  Labia Majora: Lesions - Bilateral - None. Characteristics - Bilateral - Normal.  Labia Minora: Lesions - Bilateral - None. Characteristics - Bilateral - Normal.  Urethra: Characteristics - Normal. Discharge - None.  Anton Ruiz Gland - Bilateral - Normal.  Vulva: Characteristics - Normal. Lesions - None.    Speculum & Bimanual   Vagina:   Vaginal Wall: - Normal.  Vaginal Lesions - None. Vaginal Mucosa - Normal.  Cervix: Characteristics - No Motion tenderness. Discharge - None.  Uterus: Characteristics - Normal. Position - Midposition.  Adnexa: Characteristics - Bilateral - Normal. Masses - No Adnexal Masses.  Wet Mount: pH - 3.8-4.2. Vaginal discharge - Clear  and Thin. Amine Odor - Absent. Main patient complaints - Discharge.       Rectal   Anorectal Exam: External - normal external exam.    Peripheral Vascular   Upper Extremity:   Palpation: - Pulses bilaterally normal.  Lower Extremity: Inspection - Bilateral - Inspection Normal.  Palpation: Edema - Bilateral - No edema.    Neurologic   Mental Status: - Normal.    Lymphatic  General Lymphatics   Description - Normal .       1. Encounter for well woman exam with routine gynecological exam    2. Encounter for Papanicolaou smear for cervical cancer screening    3. Screen for STD (sexually transmitted disease)

## 2024-10-04 LAB
C TRACH DNA SPEC QL NAA+PROBE: NEGATIVE
N GONORRHOEA DNA SPEC QL NAA+PROBE: NEGATIVE

## 2024-10-09 LAB
.: NORMAL
.: NORMAL

## 2024-12-12 ENCOUNTER — OFFICE VISIT (OUTPATIENT)
Dept: OBGYN CLINIC | Facility: CLINIC | Age: 24
End: 2024-12-12

## 2024-12-12 VITALS
WEIGHT: 137 LBS | SYSTOLIC BLOOD PRESSURE: 122 MMHG | DIASTOLIC BLOOD PRESSURE: 79 MMHG | HEIGHT: 60 IN | BODY MASS INDEX: 26.9 KG/M2

## 2024-12-12 DIAGNOSIS — N76.0 VAGINITIS AND VULVOVAGINITIS: Primary | ICD-10-CM

## 2024-12-12 DIAGNOSIS — N90.89 VULVAR LESION: ICD-10-CM

## 2024-12-12 DIAGNOSIS — N92.6 IRREGULAR MENSES: ICD-10-CM

## 2024-12-12 PROCEDURE — 87529 HSV DNA AMP PROBE: CPT | Performed by: OBSTETRICS & GYNECOLOGY

## 2024-12-12 PROCEDURE — 81514 NFCT DS BV&VAGINITIS DNA ALG: CPT | Performed by: OBSTETRICS & GYNECOLOGY

## 2024-12-12 NOTE — PROGRESS NOTES
Chief Complaint   Patient presents with    Gyn Exam         Verónica Monsivais is a 24 year old female who presents for BV symptoms, vaginal bump, .    LMP: 24 .    Menses regular: 28 days .    Menstrual flow normal: moderate .    Birth control or HRT: none .   Refill none   Last Pap Smear: 10/03/24  . Any history of abnormal paps: none    Gardasil:(age 9-44 y/o) n/a .   Any medication refills needed today?: none     Problems/concerns: no other concerns.      Next Appt: 10/02/25         Immunization History   Administered Date(s) Administered    Covid-19 Vaccine Moderna 100 mcg/0.5 ml 2021, 2021    TDAP 2021         Current Outpatient Medications:     levETIRAcetam 500 MG Oral Tab, Take 1 tablet (500 mg total) by mouth 2 (two) times daily., Disp: 180 tablet, Rfl: 3    Allergies[1]    OB History    Para Term  AB Living   0 0 0 0 0 0   SAB IAB Ectopic Multiple Live Births   0 0 0 0 0       Past Medical History:    Anxiety    Laceration of hand    cut hand, stitches    Seizures (HCC)       No past surgical history on file.    Family History   Problem Relation Age of Onset    No Known Problems Father     No Known Problems Mother         Tobacco  Allergies  Meds         Social History     Socioeconomic History    Marital status: Single     Spouse name: Not on file    Number of children: Not on file    Years of education: Not on file    Highest education level: Not on file   Occupational History    Not on file   Tobacco Use    Smoking status: Never    Smokeless tobacco: Never   Vaping Use    Vaping status: Never Used   Substance and Sexual Activity    Alcohol use: Never    Drug use: Never    Sexual activity: Not Currently     Partners: Male   Other Topics Concern     Service Not Asked    Blood Transfusions Not Asked    Caffeine Concern No    Occupational Exposure Not Asked    Hobby Hazards Not Asked    Sleep Concern Not Asked    Stress Concern Not Asked    Weight Concern Not  Asked    Special Diet Not Asked    Back Care Not Asked    Exercise Yes     Comment: every other day    Bike Helmet Not Asked    Seat Belt Not Asked    Self-Exams Not Asked   Social History Narrative    Not on file     Social Drivers of Health     Financial Resource Strain: Not on File (10/6/2022)    Received from [facility]    Financial Resource Strain     Financial Resource Strain: 0   Food Insecurity: Not on File (9/26/2024)    Received from [employer]    Food Insecurity     Food: 0   Transportation Needs: Not on File (10/6/2022)    Received from [facility]    Transportation Needs     Transportation: 0   Physical Activity: Not on File (10/6/2022)    Received from [facility]    Physical Activity     Physical Activity: 0   Stress: Not on File (10/6/2022)    Received from [facility]    Stress     Stress: 0   Social Connections: Not on File (9/13/2024)    Received from [employer]    Social Connections     Connectedness: 0   Housing Stability: At Risk (8/18/2023)    [facility]     Living Situation: Not on file     Housing Problems: Not on file     /79   Ht 5' (1.524 m)   Wt 137 lb (62.1 kg)   LMP 12/07/2024     Wt Readings from Last 3 Encounters:   12/12/24 137 lb (62.1 kg)   10/03/24 137 lb 14.4 oz (62.5 kg)   07/30/24 130 lb (59 kg)       Health Maintenance   Topic Date Due    Annual Physical  Never done    HPV Vaccines (1 - 3-dose series) Never done    COVID-19 Vaccine (3 - 2024-25 season) 09/01/2024    Influenza Vaccine (1) Never done    Pap Smear  10/03/2027    DTaP,Tdap,and Td Vaccines (2 - Td or Tdap) 11/30/2031    Annual Depression Screening  Completed    Pneumococcal Vaccine: Birth to 64yrs  Aged Out         Review of Systems   General: Present- Feeling well. Not Present- Fever.  Female Genitourinary: Not Present- Dysmenorrhea, Dyspareunia, Flank Pain, Frequency, Menstrual Irregularities, Pelvic Pain, Urgency, Urinary Complaints, Vaginal Bleeding and Vaginal  dryness.  Pain: Present- Pain Rating - 0 on a 0-10 scale.  All other systems negative       Physical Exam   The physical exam findings are as follows:     Abdomen   Inspection: - Inspection Normal.  Palpation/Percussion: Palpation and Percussion of the abdomen reveal - Non Tender, No hepatosplenomegaly and No Palpable abdominal masses.    Female Genitourinary     External Genitalia   Perineum - Normal. Bartholin's Gland - Bilateral - Normal. Clitoris - Normal.  Introitus: Characteristics - Normal. Discharge - None.  Labia Majora: Lesions - Bilateral - None. Characteristics - Bilateral - Normal.  Labia Minora: Lesions - Bilateral - None. Characteristics - Bilateral - Normal.  Urethra: Characteristics - Normal. Discharge - None.  Ewen Gland - Bilateral - Normal.  Vulva: Characteristics - Normal. Lesions - None.    Speculum & Bimanual   Vagina:   Vaginal Wall: - Normal.  Vaginal Lesions - None. Vaginal Mucosa - Normal.  Cervix: Characteristics - No Motion tenderness. Discharge - None.  Uterus: Characteristics - Non Tender. Position - Midposition.  Adnexa: Characteristics - Bilateral - Tender. Masses - No Adnexal Masses.  Bladder - Normal.  Wet Mount: pH - 3.8-4.2. Vaginal discharge - Clear . Amine Odor - Absent. Main patient complaints - Discharge.     Rectal   Anorectal Exam: External - normal external exam.    Lymphatic  General Lymphatics   Description - Normal .    Sebaceous cyst     1. Vaginitis and vulvovaginitis    2. Vulvar lesion    3. Irregular menses                    [1]   Allergies  Allergen Reactions    Seasonal Runny nose

## 2024-12-13 LAB
BV BACTERIA DNA VAG QL NAA+PROBE: POSITIVE
C GLABRATA DNA VAG QL NAA+PROBE: NEGATIVE
C KRUSEI DNA VAG QL NAA+PROBE: NEGATIVE
CANDIDA DNA VAG QL NAA+PROBE: POSITIVE
T VAGINALIS DNA VAG QL NAA+PROBE: NEGATIVE

## 2024-12-17 ENCOUNTER — TELEPHONE (OUTPATIENT)
Dept: OBGYN CLINIC | Facility: CLINIC | Age: 24
End: 2024-12-17

## 2024-12-17 LAB
HSV 1 NAA: NEGATIVE
HSV 1 NAA: NEGATIVE
HSV 2 NAA: NEGATIVE
HSV 2 NAA: NEGATIVE

## 2024-12-17 NOTE — TELEPHONE ENCOUNTER
----- Message from Lorrie Torrez sent at 12/13/2024  4:27 PM CST -----  Patient has BV and YEAST - treat with metrogel pv x 7 days or flagyl 500mg po bid x 7 days (avoid alcohol with oral treatment). Either rx per patient preference.    Patient also has yeast -  Give Rx Diflucan unless in the first trimester then have patient take otc monistat 7 day.     Lorrie Torrez MD

## 2024-12-19 RX ORDER — METRONIDAZOLE 7.5 MG/G
1 GEL VAGINAL NIGHTLY
Qty: 70 G | Refills: 0 | Status: SHIPPED | OUTPATIENT
Start: 2024-12-19 | End: 2024-12-23 | Stop reason: ALTCHOICE

## 2024-12-19 RX ORDER — FLUCONAZOLE 150 MG/1
150 TABLET ORAL ONCE
Qty: 1 TABLET | Refills: 0 | Status: SHIPPED | OUTPATIENT
Start: 2024-12-19 | End: 2024-12-19

## 2024-12-23 RX ORDER — METRONIDAZOLE 500 MG/1
500 TABLET ORAL 2 TIMES DAILY
Qty: 14 TABLET | Refills: 0 | Status: SHIPPED | OUTPATIENT
Start: 2024-12-23

## 2025-02-19 DIAGNOSIS — G40.909 SEIZURE DISORDER (HCC): ICD-10-CM

## 2025-02-19 RX ORDER — LEVETIRACETAM 500 MG/1
500 TABLET ORAL 2 TIMES DAILY
Qty: 180 TABLET | Refills: 0 | Status: SHIPPED | OUTPATIENT
Start: 2025-02-19

## 2025-02-19 NOTE — TELEPHONE ENCOUNTER
Patient is switching pharmacy and is requesting a new prescription to be sent over.       Medication:  levETIRAcetam 500 MG Oral Tab      Date of last refill: 07/30/2024 (#180/3)  [place]     Last office visit: 07/30/2024  Due back to clinic per last office note:  Around 01/30/2025  Date next office visit scheduled:    Future Appointments   Date Time Provider Department Center   10/2/2025 10:30 AM Lorrie Torrez MD [facility]           Last OV note recommendation:    Plan:          Procedures    Levetiracetam, S   EEG ordered again  Check keppra level  Keppra 500 mg bid. Most likely she will need to stay on ASM.  No Nurtec   May consider fioricet or imitrex for migraine  Seizure precaution, education given  See orders and medications filed with this encounter. The patient indicates understanding of these issues and agrees with the plan.  Discussed with patient/family regarding assessment, care plan   RTC 6 months, offered pt to see Dr Ramsay , the epileptologist, if interested  Pt should go ER for any new or worsening symptoms and contact office .

## 2025-05-15 DIAGNOSIS — G40.909 SEIZURE DISORDER (HCC): ICD-10-CM

## 2025-05-16 RX ORDER — LEVETIRACETAM 500 MG/1
500 TABLET ORAL 2 TIMES DAILY
Qty: 180 TABLET | Refills: 0 | Status: SHIPPED | OUTPATIENT
Start: 2025-05-16

## 2025-05-16 NOTE — TELEPHONE ENCOUNTER
Medication:  LEVETIRACETAM 500 MG Oral Tab      Date of last refill: 02/19/2025 (#180/0)  [place]     Last office visit: 07/30/2024  Due back to clinic per last office note:  6 Months   Date next office visit scheduled:    Future Appointments   Date Time Provider Department Center   10/2/2025 10:30 AM Lorrie Torrez MD [facility]           Last OV note recommendation:    Assessment:       ICD-10-CM     1. Seizure disorder (HCC)  G40.909 Levetiracetam, S       EEG       levETIRAcetam 500 MG Oral Tab       2. Migraine without aura and without status migrainosus, not intractable  G43.009            No seizure since last visit  Migraine is better     Plan:          Procedures    Levetiracetam, S   EEG ordered again  Check keppra level  Keppra 500 mg bid. Most likely she will need to stay on ASM.  No Nurtec   May consider fioricet or imitrex for migraine  Seizure precaution, education given  See orders and medications filed with this encounter. The patient indicates understanding of these issues and agrees with the plan.  Discussed with patient/family regarding assessment, care plan   RTC 6 months, offered pt to see Dr Ramsay , the epileptologist, if interested  Pt should go ER for any new or worsening symptoms and contact office .

## 2025-08-13 DIAGNOSIS — G40.909 SEIZURE DISORDER (HCC): ICD-10-CM

## 2025-08-13 RX ORDER — LEVETIRACETAM 500 MG/1
500 TABLET ORAL 2 TIMES DAILY
Qty: 180 TABLET | Refills: 0 | Status: SHIPPED | OUTPATIENT
Start: 2025-08-13 | End: 2025-08-14

## 2025-08-14 ENCOUNTER — OFFICE VISIT (OUTPATIENT)
Dept: NEUROLOGY | Facility: CLINIC | Age: 25
End: 2025-08-14

## 2025-08-14 VITALS — RESPIRATION RATE: 16 BRPM | DIASTOLIC BLOOD PRESSURE: 74 MMHG | SYSTOLIC BLOOD PRESSURE: 118 MMHG | HEART RATE: 70 BPM

## 2025-08-14 DIAGNOSIS — G40.909 SEIZURE DISORDER (HCC): Primary | ICD-10-CM

## 2025-08-14 DIAGNOSIS — G43.009 MIGRAINE WITHOUT AURA AND WITHOUT STATUS MIGRAINOSUS, NOT INTRACTABLE: ICD-10-CM

## 2025-08-14 PROCEDURE — G2211 COMPLEX E/M VISIT ADD ON: HCPCS | Performed by: OTHER

## 2025-08-14 PROCEDURE — 3074F SYST BP LT 130 MM HG: CPT | Performed by: OTHER

## 2025-08-14 PROCEDURE — 3078F DIAST BP <80 MM HG: CPT | Performed by: OTHER

## 2025-08-14 PROCEDURE — 99214 OFFICE O/P EST MOD 30 MIN: CPT | Performed by: OTHER

## 2025-08-14 RX ORDER — LEVETIRACETAM 500 MG/1
500 TABLET ORAL 2 TIMES DAILY
Qty: 180 TABLET | Refills: 3 | Status: SHIPPED | OUTPATIENT
Start: 2025-08-14

## (undated) NOTE — LETTER
24    Re: Verónica Monsivais   : 2000    To Whom it May Concern:    This patient was seen in our office on 2024 for evaluation of a medical condition. She is to refrain from driving, operating heavy machinery, climbing ladders, swimming or taking tub baths until further notice.     If this office can be of further assistance, please do not hesitate to contact us.       Sincerely,      Bryce \"Cuate\" MD Jorge  Neurology  [facility]    Diplomate, Neurology ([facility])  Diplomate, Headache Medicine ([employer])

## (undated) NOTE — LETTER
22      Re: Sarah Guillaume  : 2000    To Whom it May Concern: This patient was seen in our office on 22 for evaluation of a medical condition. she is to refrain from driving, operating heavy machinery, climbing ladders, swimming or taking tub baths until further notice. If this office can be of further assistance, please do not hesitate to contact us.        Sincerely,      Ariel Prime" Loren Ayala MD  Neurology  [address] [facility] ([facility])  Diplomate, Headache Medicine ([employer])